# Patient Record
Sex: MALE | Race: WHITE | NOT HISPANIC OR LATINO | URBAN - METROPOLITAN AREA
[De-identification: names, ages, dates, MRNs, and addresses within clinical notes are randomized per-mention and may not be internally consistent; named-entity substitution may affect disease eponyms.]

---

## 2017-11-26 ENCOUNTER — INPATIENT (INPATIENT)
Facility: HOSPITAL | Age: 42
LOS: 1 days | Discharge: HOME | DRG: 389 | End: 2017-11-28
Attending: SURGERY | Admitting: SURGERY
Payer: COMMERCIAL

## 2017-11-26 ENCOUNTER — EMERGENCY (EMERGENCY)
Facility: HOSPITAL | Age: 42
LOS: 0 days | Discharge: ANOTHER HOSPITAL | End: 2017-11-26
Attending: EMERGENCY MEDICINE | Admitting: EMERGENCY MEDICINE
Payer: COMMERCIAL

## 2017-11-26 VITALS
RESPIRATION RATE: 18 BRPM | TEMPERATURE: 99 F | OXYGEN SATURATION: 99 % | DIASTOLIC BLOOD PRESSURE: 72 MMHG | SYSTOLIC BLOOD PRESSURE: 106 MMHG | HEART RATE: 77 BPM

## 2017-11-26 VITALS
SYSTOLIC BLOOD PRESSURE: 112 MMHG | HEART RATE: 76 BPM | DIASTOLIC BLOOD PRESSURE: 64 MMHG | OXYGEN SATURATION: 98 % | RESPIRATION RATE: 18 BRPM

## 2017-11-26 VITALS
WEIGHT: 149.91 LBS | HEIGHT: 69 IN | SYSTOLIC BLOOD PRESSURE: 112 MMHG | HEART RATE: 82 BPM | RESPIRATION RATE: 16 BRPM | DIASTOLIC BLOOD PRESSURE: 71 MMHG | OXYGEN SATURATION: 100 % | TEMPERATURE: 99 F

## 2017-11-26 DIAGNOSIS — Z93.3 COLOSTOMY STATUS: ICD-10-CM

## 2017-11-26 DIAGNOSIS — K56.600 PARTIAL INTESTINAL OBSTRUCTION, UNSPECIFIED AS TO CAUSE: ICD-10-CM

## 2017-11-26 DIAGNOSIS — R10.31 RIGHT LOWER QUADRANT PAIN: ICD-10-CM

## 2017-11-26 DIAGNOSIS — K50.90 CROHN'S DISEASE, UNSPECIFIED, WITHOUT COMPLICATIONS: ICD-10-CM

## 2017-11-26 DIAGNOSIS — Z92.89 PERSONAL HISTORY OF OTHER MEDICAL TREATMENT: Chronic | ICD-10-CM

## 2017-11-26 LAB
ALBUMIN SERPL ELPH-MCNC: 3.8 G/DL — SIGNIFICANT CHANGE UP (ref 3.3–5)
ALP SERPL-CCNC: 54 U/L — SIGNIFICANT CHANGE UP (ref 40–120)
ALT FLD-CCNC: 91 U/L — HIGH (ref 12–78)
AMPHET UR-MCNC: NEGATIVE — SIGNIFICANT CHANGE UP
ANION GAP SERPL CALC-SCNC: 8 MMOL/L — SIGNIFICANT CHANGE UP (ref 5–17)
APPEARANCE UR: CLEAR — SIGNIFICANT CHANGE UP
AST SERPL-CCNC: 65 U/L — HIGH (ref 15–37)
BACTERIA # UR AUTO: (no result)
BARBITURATES UR SCN-MCNC: NEGATIVE — SIGNIFICANT CHANGE UP
BASOPHILS # BLD AUTO: 0.1 K/UL — SIGNIFICANT CHANGE UP (ref 0–0.2)
BASOPHILS NFR BLD AUTO: 0.6 % — SIGNIFICANT CHANGE UP (ref 0–2)
BENZODIAZ UR-MCNC: NEGATIVE — SIGNIFICANT CHANGE UP
BILIRUB SERPL-MCNC: 1.4 MG/DL — HIGH (ref 0.2–1.2)
BILIRUB UR-MCNC: NEGATIVE — SIGNIFICANT CHANGE UP
BUN SERPL-MCNC: 9 MG/DL — SIGNIFICANT CHANGE UP (ref 7–23)
CALCIUM SERPL-MCNC: 9.3 MG/DL — SIGNIFICANT CHANGE UP (ref 8.5–10.1)
CHLORIDE SERPL-SCNC: 105 MMOL/L — SIGNIFICANT CHANGE UP (ref 96–108)
CO2 SERPL-SCNC: 26 MMOL/L — SIGNIFICANT CHANGE UP (ref 22–31)
COCAINE METAB.OTHER UR-MCNC: NEGATIVE — SIGNIFICANT CHANGE UP
COLOR SPEC: YELLOW — SIGNIFICANT CHANGE UP
CREAT SERPL-MCNC: 1.18 MG/DL — SIGNIFICANT CHANGE UP (ref 0.5–1.3)
DIFF PNL FLD: (no result)
EOSINOPHIL # BLD AUTO: 0.1 K/UL — SIGNIFICANT CHANGE UP (ref 0–0.5)
EOSINOPHIL NFR BLD AUTO: 0.6 % — SIGNIFICANT CHANGE UP (ref 0–6)
EPI CELLS # UR: NEGATIVE — SIGNIFICANT CHANGE UP
GLUCOSE SERPL-MCNC: 84 MG/DL — SIGNIFICANT CHANGE UP (ref 70–99)
GLUCOSE UR QL: NEGATIVE MG/DL — SIGNIFICANT CHANGE UP
HCT VFR BLD CALC: 44.3 % — SIGNIFICANT CHANGE UP (ref 39–50)
HGB BLD-MCNC: 15.6 G/DL — SIGNIFICANT CHANGE UP (ref 13–17)
KETONES UR-MCNC: (no result)
LEUKOCYTE ESTERASE UR-ACNC: NEGATIVE — SIGNIFICANT CHANGE UP
LIDOCAIN IGE QN: 172 U/L — SIGNIFICANT CHANGE UP (ref 73–393)
LYMPHOCYTES # BLD AUTO: 1.5 K/UL — SIGNIFICANT CHANGE UP (ref 1–3.3)
LYMPHOCYTES # BLD AUTO: 14.9 % — SIGNIFICANT CHANGE UP (ref 13–44)
MCHC RBC-ENTMCNC: 30.5 PG — SIGNIFICANT CHANGE UP (ref 27–34)
MCHC RBC-ENTMCNC: 35.1 GM/DL — SIGNIFICANT CHANGE UP (ref 32–36)
MCV RBC AUTO: 86.7 FL — SIGNIFICANT CHANGE UP (ref 80–100)
METHADONE UR-MCNC: NEGATIVE — SIGNIFICANT CHANGE UP
MONOCYTES # BLD AUTO: 0.6 K/UL — SIGNIFICANT CHANGE UP (ref 0–0.9)
MONOCYTES NFR BLD AUTO: 5.4 % — SIGNIFICANT CHANGE UP (ref 2–14)
NEUTROPHILS # BLD AUTO: 8.2 K/UL — HIGH (ref 1.8–7.4)
NEUTROPHILS NFR BLD AUTO: 78.5 % — HIGH (ref 43–77)
NITRITE UR-MCNC: NEGATIVE — SIGNIFICANT CHANGE UP
OPIATES UR-MCNC: POSITIVE — SIGNIFICANT CHANGE UP
PCP SPEC-MCNC: SIGNIFICANT CHANGE UP
PCP UR-MCNC: NEGATIVE — SIGNIFICANT CHANGE UP
PH UR: 7 — SIGNIFICANT CHANGE UP (ref 5–8)
PLATELET # BLD AUTO: 260 K/UL — SIGNIFICANT CHANGE UP (ref 150–400)
POTASSIUM SERPL-MCNC: 4.6 MMOL/L — SIGNIFICANT CHANGE UP (ref 3.5–5.3)
POTASSIUM SERPL-SCNC: 4.6 MMOL/L — SIGNIFICANT CHANGE UP (ref 3.5–5.3)
PROT SERPL-MCNC: 7.7 GM/DL — SIGNIFICANT CHANGE UP (ref 6–8.3)
PROT UR-MCNC: NEGATIVE MG/DL — SIGNIFICANT CHANGE UP
RBC # BLD: 5.11 M/UL — SIGNIFICANT CHANGE UP (ref 4.2–5.8)
RBC # FLD: 11.2 % — SIGNIFICANT CHANGE UP (ref 10.3–14.5)
RBC CASTS # UR COMP ASSIST: SIGNIFICANT CHANGE UP /HPF (ref 0–4)
SODIUM SERPL-SCNC: 139 MMOL/L — SIGNIFICANT CHANGE UP (ref 135–145)
SP GR SPEC: 1.01 — SIGNIFICANT CHANGE UP (ref 1.01–1.02)
THC UR QL: NEGATIVE — SIGNIFICANT CHANGE UP
UROBILINOGEN FLD QL: NEGATIVE MG/DL — SIGNIFICANT CHANGE UP
WBC # BLD: 10.4 K/UL — SIGNIFICANT CHANGE UP (ref 3.8–10.5)
WBC # FLD AUTO: 10.4 K/UL — SIGNIFICANT CHANGE UP (ref 3.8–10.5)
WBC UR QL: NEGATIVE — SIGNIFICANT CHANGE UP

## 2017-11-26 PROCEDURE — 99285 EMERGENCY DEPT VISIT HI MDM: CPT | Mod: 25

## 2017-11-26 PROCEDURE — 93010 ELECTROCARDIOGRAM REPORT: CPT

## 2017-11-26 PROCEDURE — 74177 CT ABD & PELVIS W/CONTRAST: CPT | Mod: 26

## 2017-11-26 PROCEDURE — 99285 EMERGENCY DEPT VISIT HI MDM: CPT

## 2017-11-26 RX ORDER — SODIUM CHLORIDE 9 MG/ML
3 INJECTION INTRAMUSCULAR; INTRAVENOUS; SUBCUTANEOUS ONCE
Qty: 0 | Refills: 0 | Status: COMPLETED | OUTPATIENT
Start: 2017-11-26 | End: 2017-11-26

## 2017-11-26 RX ORDER — HYDROMORPHONE HYDROCHLORIDE 2 MG/ML
1 INJECTION INTRAMUSCULAR; INTRAVENOUS; SUBCUTANEOUS ONCE
Qty: 0 | Refills: 0 | Status: DISCONTINUED | OUTPATIENT
Start: 2017-11-26 | End: 2017-11-26

## 2017-11-26 RX ORDER — CIPROFLOXACIN LACTATE 400MG/40ML
VIAL (ML) INTRAVENOUS
Qty: 0 | Refills: 0 | Status: DISCONTINUED | OUTPATIENT
Start: 2017-11-27 | End: 2017-11-28

## 2017-11-26 RX ORDER — METRONIDAZOLE 500 MG
TABLET ORAL
Qty: 0 | Refills: 0 | Status: DISCONTINUED | OUTPATIENT
Start: 2017-11-27 | End: 2017-11-28

## 2017-11-26 RX ORDER — ONDANSETRON 8 MG/1
4 TABLET, FILM COATED ORAL ONCE
Qty: 0 | Refills: 0 | Status: COMPLETED | OUTPATIENT
Start: 2017-11-26 | End: 2017-11-26

## 2017-11-26 RX ORDER — METRONIDAZOLE 500 MG
500 TABLET ORAL ONCE
Qty: 0 | Refills: 0 | Status: COMPLETED | OUTPATIENT
Start: 2017-11-26 | End: 2017-11-27

## 2017-11-26 RX ORDER — SODIUM CHLORIDE 9 MG/ML
1000 INJECTION INTRAMUSCULAR; INTRAVENOUS; SUBCUTANEOUS ONCE
Qty: 0 | Refills: 0 | Status: COMPLETED | OUTPATIENT
Start: 2017-11-26 | End: 2017-11-26

## 2017-11-26 RX ORDER — CIPROFLOXACIN LACTATE 400MG/40ML
400 VIAL (ML) INTRAVENOUS ONCE
Qty: 0 | Refills: 0 | Status: COMPLETED | OUTPATIENT
Start: 2017-11-26 | End: 2017-11-27

## 2017-11-26 RX ORDER — SODIUM CHLORIDE 9 MG/ML
1000 INJECTION INTRAMUSCULAR; INTRAVENOUS; SUBCUTANEOUS
Qty: 0 | Refills: 0 | Status: DISCONTINUED | OUTPATIENT
Start: 2017-11-26 | End: 2017-11-26

## 2017-11-26 RX ORDER — METRONIDAZOLE 500 MG
500 TABLET ORAL EVERY 8 HOURS
Qty: 0 | Refills: 0 | Status: DISCONTINUED | OUTPATIENT
Start: 2017-11-27 | End: 2017-11-28

## 2017-11-26 RX ORDER — CIPROFLOXACIN LACTATE 400MG/40ML
400 VIAL (ML) INTRAVENOUS EVERY 12 HOURS
Qty: 0 | Refills: 0 | Status: DISCONTINUED | OUTPATIENT
Start: 2017-11-27 | End: 2017-11-28

## 2017-11-26 RX ORDER — SODIUM CHLORIDE 9 MG/ML
1000 INJECTION, SOLUTION INTRAVENOUS
Qty: 0 | Refills: 0 | Status: DISCONTINUED | OUTPATIENT
Start: 2017-11-26 | End: 2017-11-27

## 2017-11-26 RX ORDER — ENOXAPARIN SODIUM 100 MG/ML
40 INJECTION SUBCUTANEOUS DAILY
Qty: 0 | Refills: 0 | Status: DISCONTINUED | OUTPATIENT
Start: 2017-11-26 | End: 2017-11-28

## 2017-11-26 RX ORDER — SODIUM CHLORIDE 9 MG/ML
1000 INJECTION INTRAMUSCULAR; INTRAVENOUS; SUBCUTANEOUS
Qty: 0 | Refills: 0 | Status: COMPLETED | OUTPATIENT
Start: 2017-11-26 | End: 2017-11-26

## 2017-11-26 RX ORDER — ACETAMINOPHEN 500 MG
1000 TABLET ORAL ONCE
Qty: 0 | Refills: 0 | Status: DISCONTINUED | OUTPATIENT
Start: 2017-11-26 | End: 2017-11-26

## 2017-11-26 RX ORDER — LIDOCAINE 4 G/100G
10 CREAM TOPICAL ONCE
Qty: 0 | Refills: 0 | Status: DISCONTINUED | OUTPATIENT
Start: 2017-11-26 | End: 2017-11-26

## 2017-11-26 RX ADMIN — ONDANSETRON 4 MILLIGRAM(S): 8 TABLET, FILM COATED ORAL at 14:03

## 2017-11-26 RX ADMIN — HYDROMORPHONE HYDROCHLORIDE 1 MILLIGRAM(S): 2 INJECTION INTRAMUSCULAR; INTRAVENOUS; SUBCUTANEOUS at 14:03

## 2017-11-26 RX ADMIN — SODIUM CHLORIDE 1000 MILLILITER(S): 9 INJECTION INTRAMUSCULAR; INTRAVENOUS; SUBCUTANEOUS at 14:04

## 2017-11-26 RX ADMIN — HYDROMORPHONE HYDROCHLORIDE 1 MILLIGRAM(S): 2 INJECTION INTRAMUSCULAR; INTRAVENOUS; SUBCUTANEOUS at 14:18

## 2017-11-26 RX ADMIN — SODIUM CHLORIDE 200 MILLILITER(S): 9 INJECTION INTRAMUSCULAR; INTRAVENOUS; SUBCUTANEOUS at 22:52

## 2017-11-26 RX ADMIN — HYDROMORPHONE HYDROCHLORIDE 1 MILLIGRAM(S): 2 INJECTION INTRAMUSCULAR; INTRAVENOUS; SUBCUTANEOUS at 21:28

## 2017-11-26 RX ADMIN — HYDROMORPHONE HYDROCHLORIDE 1 MILLIGRAM(S): 2 INJECTION INTRAMUSCULAR; INTRAVENOUS; SUBCUTANEOUS at 21:36

## 2017-11-26 RX ADMIN — SODIUM CHLORIDE 3 MILLILITER(S): 9 INJECTION INTRAMUSCULAR; INTRAVENOUS; SUBCUTANEOUS at 14:15

## 2017-11-26 RX ADMIN — SODIUM CHLORIDE 200 MILLILITER(S): 9 INJECTION INTRAMUSCULAR; INTRAVENOUS; SUBCUTANEOUS at 16:44

## 2017-11-26 RX ADMIN — HYDROMORPHONE HYDROCHLORIDE 1 MILLIGRAM(S): 2 INJECTION INTRAMUSCULAR; INTRAVENOUS; SUBCUTANEOUS at 16:43

## 2017-11-26 RX ADMIN — SODIUM CHLORIDE 1000 MILLILITER(S): 9 INJECTION INTRAMUSCULAR; INTRAVENOUS; SUBCUTANEOUS at 15:34

## 2017-11-26 RX ADMIN — HYDROMORPHONE HYDROCHLORIDE 1 MILLIGRAM(S): 2 INJECTION INTRAMUSCULAR; INTRAVENOUS; SUBCUTANEOUS at 18:45

## 2017-11-26 RX ADMIN — HYDROMORPHONE HYDROCHLORIDE 1 MILLIGRAM(S): 2 INJECTION INTRAMUSCULAR; INTRAVENOUS; SUBCUTANEOUS at 15:48

## 2017-11-26 RX ADMIN — HYDROMORPHONE HYDROCHLORIDE 1 MILLIGRAM(S): 2 INJECTION INTRAMUSCULAR; INTRAVENOUS; SUBCUTANEOUS at 14:55

## 2017-11-26 RX ADMIN — HYDROMORPHONE HYDROCHLORIDE 1 MILLIGRAM(S): 2 INJECTION INTRAMUSCULAR; INTRAVENOUS; SUBCUTANEOUS at 22:53

## 2017-11-26 RX ADMIN — HYDROMORPHONE HYDROCHLORIDE 1 MILLIGRAM(S): 2 INJECTION INTRAMUSCULAR; INTRAVENOUS; SUBCUTANEOUS at 14:40

## 2017-11-26 RX ADMIN — HYDROMORPHONE HYDROCHLORIDE 1 MILLIGRAM(S): 2 INJECTION INTRAMUSCULAR; INTRAVENOUS; SUBCUTANEOUS at 15:33

## 2017-11-26 RX ADMIN — ONDANSETRON 4 MILLIGRAM(S): 8 TABLET, FILM COATED ORAL at 22:52

## 2017-11-26 NOTE — ED ADULT NURSE NOTE - OBJECTIVE STATEMENT
Pt with hx Crohns with  2 resections c/o abd pain and vomiting.  Pt is in pain distress, tearful.  Pt reports episode onset last night after midnight.

## 2017-11-26 NOTE — H&P ADULT - NSHPLABSRESULTS_GEN_ALL_CORE
15.6   10.4  )-----------( 260      ( 2017 13:46 )             44.3         139  |  105  |  9   ----------------------------<  84  4.6   |  26  |  1.18    Ca    9.3      2017 13:46    TPro  7.7  /  Alb  3.8  /  TBili  1.4<H>  /  DBili  x   /  AST  65<H>  /  ALT  91<H>  /  AlkPhos  54        Urinalysis Basic - ( 2017 14:55 )    Color: Yellow / Appearance: Clear / S.010 / pH: x  Gluc: x / Ketone: Moderate  / Bili: Negative / Urobili: Negative mg/dL   Blood: x / Protein: Negative mg/dL / Nitrite: Negative   Leuk Esterase: Negative / RBC: 0-2 /HPF / WBC Negative   Sq Epi: x / Non Sq Epi: Negative / Bacteria: Occasional        IMAGING STUDIES:  RIGHT hemicolectomy with anastomosis in the RIGHT upper   quadrant. There is distention of ileum with fecalization proximal to the   anastomosis suggesting obstruction at this level. Haziness about the rectum   may reflect inflammation.

## 2017-11-26 NOTE — ED PROVIDER NOTE - CONSTITUTIONAL, MLM
normal... Thin white male, awake, alert, oriented to person, place, time/situation and in acute distress due to abd pain. +moaning.

## 2017-11-26 NOTE — ED PROVIDER NOTE - DIAGNOSIS COUNSELING, MDM
I had a detailed discussion with the patient and/or guardian regarding the historical points, exam findings, and any diagnostic results supporting the discharge/admit diagnosis. I reviewed all lab and imaging results from this ED visit, and discussed ALL results with the patient, including all abnormal results and incidental findings. I recommened appropriate follow up for all incidental findings. The patient expressed understanding of all results discussed and follow up instructions given. conducted a detailed discussion...

## 2017-11-26 NOTE — H&P ADULT - HISTORY OF PRESENT ILLNESS
This is a 42 year old male with medical history of Crohn's disease s/p multiple bowel resections on Remicade presenting as transfer from [facility] with abdominal pain in the RLQ, the pain is constant, sharp, associated with multiple episodes of vomiting of clear fluid, He had a normal bowel movement a day ago and passed flatus around the same time. He denies fevers or chills. The pain is similar to prior episodes of bowel obstruction. Of note, the patient was admitted multiple times with bowel obstruction.   ROS: Denies fever, palpitations, chills, recent sickness, HA, vision changes, cough, SOB, chest pain, dysuria, hematuria, rash, new joint aches, sick contacts, and recent travel.

## 2017-11-26 NOTE — ED ADULT NURSE REASSESSMENT NOTE - NS ED NURSE REASSESS COMMENT FT1
Pt's mother yelling and crying at desk, demanding Dilaudid for her son, requesting Dr. Arita from [facility].
Pt is refusing oral contrast.
patient resting in bed. alert and oriented x4, respirations even and unlabored, no nausea or vomiting. states pain is tolerable at this time after receiving last dose of pain medication, informed patient I would reassess frequently and to let me know if pain worsens. iv fluids infusing. ct scan performed and resulted, results explained to patient by MD. plan to transfer patient to [place] to be seen by his surgeon dr. kiser, patient aware of plan. awaiting call from transfer center to give report. will continue to monitor.
patient transferred to [place]. transfer center contacted, spoke to luis at transfer center to confirm report given, states report received by receiving facility. patient medicated for pain prior to transfer. report given to paramedic at bedside. patient's mother to follow patient to [place]. patient alert and oriented x4, respirations even and unlabored, states pain alleviated post medication administration prior to leaving emergency department. transferred from ED at 2140.

## 2017-11-26 NOTE — ED PROVIDER NOTE - MEDICAL DECISION MAKING DETAILS
Tx from OSH, crohns flare with bowel obstruction. Plan: labs, consult surgery, NG tube, pain control, ivf

## 2017-11-26 NOTE — ED PROVIDER NOTE - OBJECTIVE STATEMENT
43 y/o white M with PMHx of Crohn's, multiple abd surgeries (pt reports no APPY to date) BIBA from home presents to the ED c/o acute, sharp, severe RLQ abd pain. Pain started at midnight last night and does not radiate. Pt likens pain to prior Crohn's exacerbations. Pt reports N/V, poor flatus. Pt denies Fever, chills, worsened SOB. Pt wears a colostomy bag (9 months). Pt has had prior episodes of SBO. Allergic to Penicillin and Ciklor. Surgeon: Dr. Javed at [place]. PMD: Dr. Lefty Bañuelos.

## 2017-11-26 NOTE — H&P ADULT - NSHPPHYSICALEXAM_GEN_ALL_CORE
General: NAD  Neurology: A&Ox3  ENT:  Mucosa dry   Lymphatic:  No palpable cervical  Respiratory: CTA B/L  CV: RRR, S1S2, no murmur  Abdominal: Soft, Not distended, mildly tender in RLQ, no guarding, no palpable masses or hernias.   MSK: No edema

## 2017-11-26 NOTE — ED PROVIDER NOTE - GASTROINTESTINAL, MLM
Abdomen: surgical scars well healed, minimal BS, not high pitched. Diffuse abd tenderness, greatest in RLQ.

## 2017-11-26 NOTE — H&P ADULT - ASSESSMENT
A/P: This is a 42 year old male with a medical history of Crohn's disease on Remicade, multiple bowel resection in the past (s/p right hemicolectomy with ileocolic anastomosis) presents with SBO at the anastomosis site.      - Admit to colorectal surgery (Red Surgery) under the care of Dr. Papi Javed   - Make him Nil Per Os  - No need for NGT  - Serial abdominal exams, no PRN pain medication   - IV hydration.   - Follow GI function, would treat this episode of SBO conservatively with bowel rest.   - Discussed with Dr. Papi Sierra (colorectal surgery)

## 2017-11-26 NOTE — ED PROVIDER NOTE - MEDICAL DECISION MAKING DETAILS
41 y/o white M h/o Crohn's, multiple abd surgeries (pt reports no APPY to date) BIBA from home with acute, severe RLQ abd pain. Pt likens pain to prior Crohn's exacerbations. Poor flatus, + N/V. No Fever, chills, SOB.  Diffuse abd tenderness especially RLQ, + voluntary guarding.  Plan for labs, IV fluids, IV pain meds, Zofran, urine, CT abd/pelvis.

## 2017-11-26 NOTE — ED PROVIDER NOTE - PHYSICAL EXAMINATION
Gen: NAD, AOx3  Head: NCAT  HEENT: PERRL, oral mucosa moist, normal conjunctiva  Lung: CTAB, no respiratory distress  CV: rrr, no murmurs, Normal perfusion  Abd: soft, tender RLQ, no CVA tenderness  MSK: No edema, no visible deformities  Neuro: No focal neurologic deficits  Skin: No rash   Psych: normal affect
Statement Selected

## 2017-11-26 NOTE — ED PROVIDER NOTE - PROGRESS NOTE DETAILS
Dr. Mercado:  Pt has required  IV dilaudid ~ q 1 hr. for his abd. pain.  CT A/P suggestive of PSBO.  Mother requesting pt's surgeon Dr. Javed ([facility]), to be called.  Call put out. Attending Gavin ROJO:  Sign out from Dr. Mercado.  Surgeon at [place] paged for SBO. Dr. Mercado:  Signout to Dr. Alonso:  [] d/w Dr. Javed or covering surgeon for possible tx.  If tx declined, pt TBA surgery svce at . Attending Dr. Alonso:  Spoke to Dr. Sierra, Surgery at [place] who agreed for transfer to the ED.

## 2017-11-26 NOTE — ED PROVIDER NOTE - OBJECTIVE STATEMENT
Patient is 42 y M with PMH crohns s/p multiple bowel resections on remicade presenting as tx from [facility] with abdominal pain RLQ pain, constant, sharp, a/w multiple episodes of vomiting clear fluid, had diarrhea (normal for patient) 9PM 11/25. Sx consistent with usual flares.     PMD: Lefty Bañuelos  GI: in [place]  Surg: J Procaccino/Ericka  ROS: Denies fever, palpitations, chills, recent sickness, HA, vision changes, cough, SOB, chest pain, dysuria, hematuria, rash, new joint aches, sick contacts, and recent travel.

## 2017-11-26 NOTE — ED PROVIDER NOTE - MUSCULOSKELETAL, MLM
Spine appears normal, range of motion is not limited, no muscle or joint tenderness. MAEx4. No leg pain.

## 2017-11-27 DIAGNOSIS — K56.609 UNSPECIFIED INTESTINAL OBSTRUCTION, UNSPECIFIED AS TO PARTIAL VERSUS COMPLETE OBSTRUCTION: ICD-10-CM

## 2017-11-27 LAB
ANION GAP SERPL CALC-SCNC: 12 MMOL/L — SIGNIFICANT CHANGE UP (ref 5–17)
BUN SERPL-MCNC: 9 MG/DL — SIGNIFICANT CHANGE UP (ref 7–23)
CALCIUM SERPL-MCNC: 8.7 MG/DL — SIGNIFICANT CHANGE UP (ref 8.4–10.5)
CHLORIDE SERPL-SCNC: 101 MMOL/L — SIGNIFICANT CHANGE UP (ref 96–108)
CO2 SERPL-SCNC: 25 MMOL/L — SIGNIFICANT CHANGE UP (ref 22–31)
CREAT SERPL-MCNC: 1.02 MG/DL — SIGNIFICANT CHANGE UP (ref 0.5–1.3)
GLUCOSE SERPL-MCNC: 91 MG/DL — SIGNIFICANT CHANGE UP (ref 70–99)
HCT VFR BLD CALC: 41.7 % — SIGNIFICANT CHANGE UP (ref 39–50)
HGB BLD-MCNC: 14.6 G/DL — SIGNIFICANT CHANGE UP (ref 13–17)
MAGNESIUM SERPL-MCNC: 1.7 MG/DL — SIGNIFICANT CHANGE UP (ref 1.6–2.6)
MCHC RBC-ENTMCNC: 31.4 PG — SIGNIFICANT CHANGE UP (ref 27–34)
MCHC RBC-ENTMCNC: 35.1 GM/DL — SIGNIFICANT CHANGE UP (ref 32–36)
MCV RBC AUTO: 89.6 FL — SIGNIFICANT CHANGE UP (ref 80–100)
PHOSPHATE SERPL-MCNC: 2.9 MG/DL — SIGNIFICANT CHANGE UP (ref 2.5–4.5)
PLATELET # BLD AUTO: 191 K/UL — SIGNIFICANT CHANGE UP (ref 150–400)
POTASSIUM SERPL-MCNC: 3.8 MMOL/L — SIGNIFICANT CHANGE UP (ref 3.5–5.3)
POTASSIUM SERPL-SCNC: 3.8 MMOL/L — SIGNIFICANT CHANGE UP (ref 3.5–5.3)
RBC # BLD: 4.66 M/UL — SIGNIFICANT CHANGE UP (ref 4.2–5.8)
RBC # FLD: 11.3 % — SIGNIFICANT CHANGE UP (ref 10.3–14.5)
SODIUM SERPL-SCNC: 138 MMOL/L — SIGNIFICANT CHANGE UP (ref 135–145)
WBC # BLD: 6.1 K/UL — SIGNIFICANT CHANGE UP (ref 3.8–10.5)
WBC # FLD AUTO: 6.1 K/UL — SIGNIFICANT CHANGE UP (ref 3.8–10.5)

## 2017-11-27 PROCEDURE — 74000: CPT | Mod: 26

## 2017-11-27 PROCEDURE — 99221 1ST HOSP IP/OBS SF/LOW 40: CPT

## 2017-11-27 RX ORDER — DEXTROSE MONOHYDRATE, SODIUM CHLORIDE, AND POTASSIUM CHLORIDE 50; .745; 4.5 G/1000ML; G/1000ML; G/1000ML
1000 INJECTION, SOLUTION INTRAVENOUS
Qty: 0 | Refills: 0 | Status: DISCONTINUED | OUTPATIENT
Start: 2017-11-27 | End: 2017-11-28

## 2017-11-27 RX ORDER — HYDROMORPHONE HYDROCHLORIDE 2 MG/ML
1 INJECTION INTRAMUSCULAR; INTRAVENOUS; SUBCUTANEOUS ONCE
Qty: 0 | Refills: 0 | Status: DISCONTINUED | OUTPATIENT
Start: 2017-11-27 | End: 2017-11-27

## 2017-11-27 RX ORDER — HYDROMORPHONE HYDROCHLORIDE 2 MG/ML
0.5 INJECTION INTRAMUSCULAR; INTRAVENOUS; SUBCUTANEOUS ONCE
Qty: 0 | Refills: 0 | Status: DISCONTINUED | OUTPATIENT
Start: 2017-11-27 | End: 2017-11-27

## 2017-11-27 RX ORDER — ACETAMINOPHEN 500 MG
650 TABLET ORAL EVERY 6 HOURS
Qty: 0 | Refills: 0 | Status: DISCONTINUED | OUTPATIENT
Start: 2017-11-27 | End: 2017-11-28

## 2017-11-27 RX ORDER — INFLUENZA VIRUS VACCINE 15; 15; 15; 15 UG/.5ML; UG/.5ML; UG/.5ML; UG/.5ML
0.5 SUSPENSION INTRAMUSCULAR ONCE
Qty: 0 | Refills: 0 | Status: DISCONTINUED | OUTPATIENT
Start: 2017-11-27 | End: 2017-11-28

## 2017-11-27 RX ORDER — ONDANSETRON 8 MG/1
4 TABLET, FILM COATED ORAL EVERY 6 HOURS
Qty: 0 | Refills: 0 | Status: DISCONTINUED | OUTPATIENT
Start: 2017-11-27 | End: 2017-11-28

## 2017-11-27 RX ORDER — ACETAMINOPHEN 500 MG
1000 TABLET ORAL ONCE
Qty: 0 | Refills: 0 | Status: COMPLETED | OUTPATIENT
Start: 2017-11-27 | End: 2017-11-27

## 2017-11-27 RX ADMIN — HYDROMORPHONE HYDROCHLORIDE 1 MILLIGRAM(S): 2 INJECTION INTRAMUSCULAR; INTRAVENOUS; SUBCUTANEOUS at 00:00

## 2017-11-27 RX ADMIN — HYDROMORPHONE HYDROCHLORIDE 1 MILLIGRAM(S): 2 INJECTION INTRAMUSCULAR; INTRAVENOUS; SUBCUTANEOUS at 01:24

## 2017-11-27 RX ADMIN — Medication 650 MILLIGRAM(S): at 18:20

## 2017-11-27 RX ADMIN — Medication 100 MILLIGRAM(S): at 21:23

## 2017-11-27 RX ADMIN — ONDANSETRON 4 MILLIGRAM(S): 8 TABLET, FILM COATED ORAL at 05:28

## 2017-11-27 RX ADMIN — HYDROMORPHONE HYDROCHLORIDE 1 MILLIGRAM(S): 2 INJECTION INTRAMUSCULAR; INTRAVENOUS; SUBCUTANEOUS at 05:01

## 2017-11-27 RX ADMIN — Medication 1000 MILLIGRAM(S): at 09:02

## 2017-11-27 RX ADMIN — ENOXAPARIN SODIUM 40 MILLIGRAM(S): 100 INJECTION SUBCUTANEOUS at 12:34

## 2017-11-27 RX ADMIN — Medication 650 MILLIGRAM(S): at 23:51

## 2017-11-27 RX ADMIN — Medication 200 MILLIGRAM(S): at 17:15

## 2017-11-27 RX ADMIN — ENOXAPARIN SODIUM 40 MILLIGRAM(S): 100 INJECTION SUBCUTANEOUS at 00:05

## 2017-11-27 RX ADMIN — Medication 100 MILLIGRAM(S): at 05:00

## 2017-11-27 RX ADMIN — Medication 100 MILLIGRAM(S): at 15:08

## 2017-11-27 RX ADMIN — Medication 200 MILLIGRAM(S): at 05:28

## 2017-11-27 RX ADMIN — HYDROMORPHONE HYDROCHLORIDE 0.5 MILLIGRAM(S): 2 INJECTION INTRAMUSCULAR; INTRAVENOUS; SUBCUTANEOUS at 12:34

## 2017-11-27 RX ADMIN — Medication 100 MILLIGRAM(S): at 01:24

## 2017-11-27 RX ADMIN — HYDROMORPHONE HYDROCHLORIDE 0.5 MILLIGRAM(S): 2 INJECTION INTRAMUSCULAR; INTRAVENOUS; SUBCUTANEOUS at 08:32

## 2017-11-27 RX ADMIN — HYDROMORPHONE HYDROCHLORIDE 1 MILLIGRAM(S): 2 INJECTION INTRAMUSCULAR; INTRAVENOUS; SUBCUTANEOUS at 05:31

## 2017-11-27 RX ADMIN — Medication 200 MILLIGRAM(S): at 00:05

## 2017-11-27 RX ADMIN — Medication 400 MILLIGRAM(S): at 08:32

## 2017-11-27 RX ADMIN — SODIUM CHLORIDE 125 MILLILITER(S): 9 INJECTION, SOLUTION INTRAVENOUS at 01:23

## 2017-11-27 RX ADMIN — Medication 650 MILLIGRAM(S): at 17:50

## 2017-11-27 RX ADMIN — HYDROMORPHONE HYDROCHLORIDE 0.5 MILLIGRAM(S): 2 INJECTION INTRAMUSCULAR; INTRAVENOUS; SUBCUTANEOUS at 09:02

## 2017-11-27 RX ADMIN — HYDROMORPHONE HYDROCHLORIDE 0.5 MILLIGRAM(S): 2 INJECTION INTRAMUSCULAR; INTRAVENOUS; SUBCUTANEOUS at 13:04

## 2017-11-27 NOTE — CHART NOTE - NSCHARTNOTEFT_GEN_A_CORE
-Paged by RN due to patient reporting abdominal pain. Patient seen and examined. Patient stable. Denies worsening pain, reports the pain is bad but not as bad as when he came in. Patient denies N/V. Reports passing flatus and BM.     ICU Vital Signs Last 24 Hrs  T(C): 36.7 (27 Nov 2017 06:23), Max: 37.3 (26 Nov 2017 13:34)  T(F): 98.1 (27 Nov 2017 06:23), Max: 99.1 (26 Nov 2017 13:34)  HR: 84 (27 Nov 2017 06:23) (60 - 84)  BP: 103/62 (27 Nov 2017 06:23) (103/62 - 131/72)  RR: 18 (27 Nov 2017 06:23) (16 - 18)  SpO2: 96% (27 Nov 2017 06:23) (96% - 100%)      Physical Exam:   Gen: NAD, AAOx3  Abd: soft, Non-distended, mild tenderness in the RLQ (unchanged)      42 year old male with SBO   -Pain control   -Monitor GI function   -Await diet at tolerated once exam has improved  -Continue to monitor with serial abdominal exams     Bea Hargrove PA-C  #[id number]

## 2017-11-27 NOTE — CHART NOTE - NSCHARTNOTEFT_GEN_A_CORE
Paged by RN for abdominal pain. Patient seen and examined. Patient stable. Reports pain is improving but still there. Denies N/V. Reports passing flatus but not BM. Reports the pain is not as bad as when he came in.       Vital Signs Last 24 Hrs  T(C): 36.9 (27 Nov 2017 10:36), Max: 37.3 (26 Nov 2017 13:34)  T(F): 98.4 (27 Nov 2017 10:36), Max: 99.1 (26 Nov 2017 13:34)  HR: 76 (27 Nov 2017 10:36) (60 - 84)  BP: 111/65 (27 Nov 2017 10:36) (103/62 - 131/72)  BP(mean): --  RR: 18 (27 Nov 2017 10:36) (16 - 18)  SpO2: 99% (27 Nov 2017 10:36) (96% - 100%)    Physical Exam:  Gen: NAD, AAOx3  Abd: soft, mild tenderness in the RLQ, non distended.  No rebound, no guarding, patient is not peritoneal.     Will discuss with attending physician or chief resident. Exam unchanged.     Bea Hargrove PA-C  #[id number]

## 2017-11-27 NOTE — PROGRESS NOTE ADULT - SUBJECTIVE AND OBJECTIVE BOX
Red Team Surgery Progress Note    SUBJECTIVE: Pt seen and examined at bedside. No overnight events. Pain is controlled with dilaudid 1mg IV - pt examined before medication and was not peritoneal. Patient has nausea and was given zofran.       Vital Signs Last 24 Hrs  T(C): 36.9 (2017 01:54), Max: 37.3 (2017 13:34)  T(F): 98.4 (2017 01:54), Max: 99.1 (2017 13:34)  HR: 61 (:54) (60 - 82)  BP: 103/63 (:54) (103/63 - 131/72)  BP(mean): --  RR: 16 (:54) (16 - 18)  SpO2: 96% (:54) (96% - 100%)    Physical Exam:  General Appearance: Appears well, NAD  Neck: Supple  Chest: Equal expansion bilaterally, equal breath sounds  CV: Pulse regular presently  Abdomen:   Extremities: Grossly symmetric    LABS:                        15.6   10.4  )-----------( 260      ( 2017 13:46 )             44.3         139  |  105  |  9   ----------------------------<  84  4.6   |  26  |  1.18    Ca    9.3      2017 13:46    TPro  7.7  /  Alb  3.8  /  TBili  1.4<H>  /  DBili  x   /  AST  65<H>  /  ALT  91<H>  /  AlkPhos  54        Urinalysis Basic - ( 2017 14:55 )    Color: Yellow / Appearance: Clear / S.010 / pH: x  Gluc: x / Ketone: Moderate  / Bili: Negative / Urobili: Negative mg/dL   Blood: x / Protein: Negative mg/dL / Nitrite: Negative   Leuk Esterase: Negative / RBC: 0-2 /HPF / WBC Negative   Sq Epi: x / Non Sq Epi: Negative / Bacteria: Occasional        INs and OUTs:    17 @ 07:01  -  17 @ 05:20  --------------------------------------------------------  IN: 700 mL / OUT: 400 mL / NET: 300 mL Red Team Surgery Progress Note    SUBJECTIVE: Pt seen and examined at bedside. No overnight events. Pain is controlled with dilaudid 1mg IV - pt examined before medication and was not peritoneal. Patient has nausea and was given zofran.       Vital Signs Last 24 Hrs  T(C): 36.9 (2017 01:54), Max: 37.3 (2017 13:34)  T(F): 98.4 (2017 01:54), Max: 99.1 (2017 13:34)  HR: 61 (:54) (60 - 82)  BP: 103/63 (:54) (103/63 - 131/72)  BP(mean): --  RR: 16 (:54) (16 - 18)  SpO2: 96% (:54) (96% - 100%)    Physical Exam:  General Appearance: Appears well, NAD  Neck: Supple  Chest: Equal expansion bilaterally, equal breath sounds  CV: Pulse regular presently  Abdomen: Nontense, non-peritoneal, mildly distended. Tympanitic, non-tender to palpation	  Extremities: Grossly symmetric    LABS:                        15.6   10.4  )-----------( 260      ( 2017 13:46 )             44.3         139  |  105  |  9   ----------------------------<  84  4.6   |  26  |  1.18    Ca    9.3      2017 13:46    TPro  7.7  /  Alb  3.8  /  TBili  1.4<H>  /  DBili  x   /  AST  65<H>  /  ALT  91<H>  /  AlkPhos  54        Urinalysis Basic - ( 2017 14:55 )    Color: Yellow / Appearance: Clear / S.010 / pH: x  Gluc: x / Ketone: Moderate  / Bili: Negative / Urobili: Negative mg/dL   Blood: x / Protein: Negative mg/dL / Nitrite: Negative   Leuk Esterase: Negative / RBC: 0-2 /HPF / WBC Negative   Sq Epi: x / Non Sq Epi: Negative / Bacteria: Occasional        INs and OUTs:    17 @ 07:01  -  17 @ 05:20  --------------------------------------------------------  IN: 700 mL / OUT: 400 mL / NET: 300 mL

## 2017-11-27 NOTE — CONSULT NOTE ADULT - SUBJECTIVE AND OBJECTIVE BOX
Mr. Batista is a 43 yo M w/ longstanding history of Crohn's Disease limited to the small bowel who is admitted w/ partial small bowel obstruction.  Mr. Batista has had five abdominal surgeries related to his CD. 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 He started Remicade at this time and has not had any other surgeries.  He has had multiple bowel obstructions which usually resolve with bowel rest / pain meds / IV Fluids. Patient was last seen in our office over ten years ago. He current sees a Gastroenterologist in [place] where he currently resides. He had a colonoscopy 3 years ago which reportedly did not show any active CD. Patient was visiting his parents for Thanksgiving and developed abdominal pain, multiple episodes of emesis and was admitted w/ bowel obstruction which he admits is like due to eating foods that are high fiber/unusual for him to normally consume. He improved clinically with bowel rest/ IVF/Pain control.  He currently feels well. ++ flatus but no BM yet. No nausea or vomiting. Abdominal cramps have resolved but he has residual "soreness" for which he needs IV pain meds.        PAST MEDICAL & SURGICAL HISTORY:  Crohns disease  History of bowel resection      MEDICATIONS  (STANDING):  ciprofloxacin   IVPB 400 milliGRAM(s) IV Intermittent every 12 hours  ciprofloxacin   IVPB      dextrose 5% + sodium chloride 0.45% with potassium chloride 20 mEq/L 1000 milliLiter(s) (75 mL/Hr) IV Continuous <Continuous>  enoxaparin Injectable 40 milliGRAM(s) SubCutaneous daily  influenza   Vaccine 0.5 milliLiter(s) IntraMuscular once  metroNIDAZOLE  IVPB 500 milliGRAM(s) IV Intermittent every 8 hours  metroNIDAZOLE  IVPB        MEDICATIONS  (PRN):  ondansetron Injectable 4 milliGRAM(s) IV Push every 6 hours PRN Nausea and/or Vomiting      Allergies    Ceclor (Hives)  penicillin (Hives)  penicillins (Unknown)    Intolerances        Review of Systems:    General:  No wt loss, fevers, chills, night sweats,fatigue,   CV:  No pain, palpitatioins, hypo/hypertension  Resp:  No dyspnea, cough, tachypnea, wheezing  GI:  see HPI  :  No pain, bleeding, incontinence, nocturia  Muscle:  No pain, weakness  Neuro:  No weakness, tingling, memory problems  Psych:  No fatigue, insomnia, mood problems, depression  Endocrine:  No polyuria, polydypsia, cold/heat intolerance  Heme:  No petechiae, ecchymosis, easy bruisability  Skin:  No rash, tattoos, scars, edema      Vital Signs Last 24 Hrs  T(C): 37 (2017 14:44), Max: 37.3 (2017 18:40)  T(F): 98.6 (2017 14:44), Max: 99.1 (2017 18:40)  HR: 62 (2017 14:44) (60 - 84)  BP: 113/66 (2017 14:44) (103/62 - 131/72)  BP(mean): --  RR: 18 (2017 14:44) (16 - 18)  SpO2: 99% (2017 14:44) (96% - 100%)    PHYSICAL EXAM:    Constitutional: NAD, well-developed  Neck: No LAD, supple  Respiratory: CTA and P  Cardiovascular: S1 and S2, RRR, no M  Gastrointestinal: BS+, prior surgical incisions well healed. mild abd distention but no pain or tenderness on exam  Extremities: No peripheral edema, neg clubing, cyanosis  Vascular: 2+ peripheral pulses  Neurological: A/O x 3, no focal deficits  Psychiatric: Normal mood, normal affect  Skin: No rashes      LABS:                        14.6   6.1   )-----------( 191      ( 2017 12:32 )             41.7                         15.6   10.4  )-----------( 260      ( 2017 13:46 )             44.3         138  |  101  |  9   ----------------------------<  91  3.8   |  25  |  1.02    Ca    8.7      2017 12:32  Phos  2.9       Mg     1.7         TPro  7.7  /  Alb  3.8  /  TBili  1.4<H>  /  DBili  x   /  AST  65<H>  /  ALT  91<H>  /  AlkPhos  54        Urinalysis Basic - ( 2017 14:55 )    Color: Yellow / Appearance: Clear / S.010 / pH: x  Gluc: x / Ketone: Moderate  / Bili: Negative / Urobili: Negative mg/dL   Blood: x / Protein: Negative mg/dL / Nitrite: Negative   Leuk Esterase: Negative / RBC: 0-2 /HPF / WBC Negative   Sq Epi: x / Non Sq Epi: Negative / Bacteria: Occasional      LIVER FUNCTIONS - ( 2017 13:46 )  Alb: 3.8 g/dL / Pro: 7.7 gm/dL / ALK PHOS: 54 U/L / ALT: 91 U/L / AST: 65 U/L / GGT: x           Lipase, Serum: 172 U/L (17 @ 13:46)        RADIOLOGY & ADDITIONAL TESTS:

## 2017-11-27 NOTE — CONSULT NOTE ADULT - ASSESSMENT
41 yo M w/ CD on Remicade admitted w/ bowel obstruction which has clinically much improved  Recommend AXR today  Trial of clear liquid diet  OOB / ambulate as tolerated   Advised patient to limit opioids if possible  Will follow

## 2017-11-28 VITALS
OXYGEN SATURATION: 98 % | SYSTOLIC BLOOD PRESSURE: 107 MMHG | HEART RATE: 70 BPM | DIASTOLIC BLOOD PRESSURE: 75 MMHG | TEMPERATURE: 98 F | RESPIRATION RATE: 18 BRPM

## 2017-11-28 LAB
ANION GAP SERPL CALC-SCNC: 12 MMOL/L — SIGNIFICANT CHANGE UP (ref 5–17)
BUN SERPL-MCNC: 7 MG/DL — SIGNIFICANT CHANGE UP (ref 7–23)
CALCIUM SERPL-MCNC: 8.7 MG/DL — SIGNIFICANT CHANGE UP (ref 8.4–10.5)
CHLORIDE SERPL-SCNC: 101 MMOL/L — SIGNIFICANT CHANGE UP (ref 96–108)
CO2 SERPL-SCNC: 26 MMOL/L — SIGNIFICANT CHANGE UP (ref 22–31)
CREAT SERPL-MCNC: 0.9 MG/DL — SIGNIFICANT CHANGE UP (ref 0.5–1.3)
GLUCOSE SERPL-MCNC: 95 MG/DL — SIGNIFICANT CHANGE UP (ref 70–99)
HCT VFR BLD CALC: 36.3 % — LOW (ref 39–50)
HGB BLD-MCNC: 13.4 G/DL — SIGNIFICANT CHANGE UP (ref 13–17)
MAGNESIUM SERPL-MCNC: 1.9 MG/DL — SIGNIFICANT CHANGE UP (ref 1.6–2.6)
MCHC RBC-ENTMCNC: 32.8 PG — SIGNIFICANT CHANGE UP (ref 27–34)
MCHC RBC-ENTMCNC: 36.9 GM/DL — HIGH (ref 32–36)
MCV RBC AUTO: 88.8 FL — SIGNIFICANT CHANGE UP (ref 80–100)
PHOSPHATE SERPL-MCNC: 3 MG/DL — SIGNIFICANT CHANGE UP (ref 2.5–4.5)
PLATELET # BLD AUTO: 190 K/UL — SIGNIFICANT CHANGE UP (ref 150–400)
POTASSIUM SERPL-MCNC: 4 MMOL/L — SIGNIFICANT CHANGE UP (ref 3.5–5.3)
POTASSIUM SERPL-SCNC: 4 MMOL/L — SIGNIFICANT CHANGE UP (ref 3.5–5.3)
RBC # BLD: 4.09 M/UL — LOW (ref 4.2–5.8)
RBC # FLD: 12.7 % — SIGNIFICANT CHANGE UP (ref 10.3–14.5)
SODIUM SERPL-SCNC: 139 MMOL/L — SIGNIFICANT CHANGE UP (ref 135–145)
WBC # BLD: 5.01 K/UL — SIGNIFICANT CHANGE UP (ref 3.8–10.5)
WBC # FLD AUTO: 5.01 K/UL — SIGNIFICANT CHANGE UP (ref 3.8–10.5)

## 2017-11-28 PROCEDURE — 84100 ASSAY OF PHOSPHORUS: CPT

## 2017-11-28 PROCEDURE — 80048 BASIC METABOLIC PNL TOTAL CA: CPT

## 2017-11-28 PROCEDURE — 96375 TX/PRO/DX INJ NEW DRUG ADDON: CPT

## 2017-11-28 PROCEDURE — 96372 THER/PROPH/DIAG INJ SC/IM: CPT | Mod: XU

## 2017-11-28 PROCEDURE — 96374 THER/PROPH/DIAG INJ IV PUSH: CPT

## 2017-11-28 PROCEDURE — 74018 RADEX ABDOMEN 1 VIEW: CPT

## 2017-11-28 PROCEDURE — 99285 EMERGENCY DEPT VISIT HI MDM: CPT | Mod: 25

## 2017-11-28 PROCEDURE — 99231 SBSQ HOSP IP/OBS SF/LOW 25: CPT

## 2017-11-28 PROCEDURE — 83735 ASSAY OF MAGNESIUM: CPT

## 2017-11-28 PROCEDURE — 85027 COMPLETE CBC AUTOMATED: CPT

## 2017-11-28 RX ADMIN — Medication 650 MILLIGRAM(S): at 00:21

## 2017-11-28 RX ADMIN — Medication 100 MILLIGRAM(S): at 05:01

## 2017-11-28 RX ADMIN — Medication 200 MILLIGRAM(S): at 06:20

## 2017-11-28 NOTE — DISCHARGE NOTE ADULT - MEDICATION SUMMARY - MEDICATIONS TO TAKE
I will START or STAY ON the medications listed below when I get home from the hospital:    Remicade  -- Indication: For imunosuppressive agents

## 2017-11-28 NOTE — DISCHARGE NOTE ADULT - CARE PROVIDER_API CALL
Papi Javed), ColonRectal Surgery; Surgery  [address] [facility]  [address]  [place] [address]  Phone: [phone]  Fax: [phone]    Kassie West), Internal Medicine  [address] [place] [id number]  Phone: [phone]  Fax: [phone]

## 2017-11-28 NOTE — DISCHARGE NOTE ADULT - ADDITIONAL INSTRUCTIONS
At the time of discharge, the patient was hemodynamically stable, was tolerating PO diet, was voiding urine and passing stool, was ambulating, and was comfortable with adequate pain control. The patient was instructed to follow up with Dr. Javed within 1 week after discharge from the hospital. The patient felt comfortable with discharge. The patient was discharged to home. The patient had no other issues.    Please notify your Attending Physician if after discharge you have fever, chills, abdominal pain, purulent drainage from your incision, in ability to pass flatus, not tolerating PO diet, abdominal bloating/distention, naseau and or vomiting. Additionally any acute changes as well. Please report back to the Emergency Department if unable to reach Physician. WOUND CARE:   BATHING: Please do not submerge wound underwater. You may shower and/or sponge bathe.  ACTIVITY: No heavy lifting or straining. Otherwise, you may return to your usual level of physical activity. If you are taking narcotic pain medication (such as Percocet), do NOT drive a car, operate machinery or make important decisions.  DIET: Return to your usual diet.  NOTIFY YOUR SURGEON IF: You have any bleeding that does not stop, any pus draining from your wound, any fever (over 100.4 F) or chills, persistent nausea/vomiting, persistent diarrhea, or if your pain is not controlled on your discharge pain medications.  FOLLOW-UP:  1. Please call to make a follow-up appointment within one week of discharge with Dr. Javed.   2. Please follow up with your primary care physician in one week regarding your hospitalization.  3. Please follow up within one week of discharge with Dr. West, for follow up.         Please notify your Attending Physician if after discharge you have fever, chills, abdominal pain, purulent drainage from your incision, in ability to pass flatus, not tolerating PO diet, abdominal bloating/distention, naseau and or vomiting. Additionally any acute changes as well. Please report back to the Emergency Department if unable to reach Physician.

## 2017-11-28 NOTE — DISCHARGE NOTE ADULT - PLAN OF CARE
s/p SBO resolution -F/U within one week with Dr. Javed and Dr. West.    -Do not perform strenuous activity until less cleared by a physician   -LRD

## 2017-11-28 NOTE — PROGRESS NOTE ADULT - ASSESSMENT
43 y/o manw tih crohns diease on remicade with anastomotic stricture/SBO triggered by dietery non compliance  appreciate surgical care.   SBO clinically resolved  f/u as outpatient  if tolerate diet to be discharged home  call with any questions  [phone]

## 2017-11-28 NOTE — PROGRESS NOTE ADULT - ASSESSMENT
Assessment   42y Male who presents with Small bowel obstruction    Plan:  -DVT PPX  -Pain control   -OOB as tolerated with assistance   -Advance diet as tolerated  -Await Gi Fxn   -Dispo Planning     Bea Hargrove   #[id number] @ 06:53

## 2017-11-28 NOTE — DISCHARGE NOTE ADULT - NS AS ACTIVITY OBS
Do not make important decisions/Do not drive or operate machinery/Showering allowed/Not while taking pain medication/No Heavy lifting/straining

## 2017-11-28 NOTE — PROGRESS NOTE ADULT - SUBJECTIVE AND OBJECTIVE BOX
INTERVAL HPI/OVERNIGHT EVENTS:  feels beter. having bowel movements. no further distention or pain    MEDICATIONS  (STANDING):  ciprofloxacin   IVPB 400 milliGRAM(s) IV Intermittent every 12 hours  ciprofloxacin   IVPB      enoxaparin Injectable 40 milliGRAM(s) SubCutaneous daily  influenza   Vaccine 0.5 milliLiter(s) IntraMuscular once  metroNIDAZOLE  IVPB 500 milliGRAM(s) IV Intermittent every 8 hours  metroNIDAZOLE  IVPB        MEDICATIONS  (PRN):  acetaminophen   Tablet. 650 milliGRAM(s) Oral every 6 hours PRN Mild Pain (1 - 3)  ondansetron Injectable 4 milliGRAM(s) IV Push every 6 hours PRN Nausea and/or Vomiting      Allergies    Ceclor (Hives)  penicillin (Hives)  penicillins (Unknown)    Intolerances        Vital Signs Last 24 Hrs  T(C): 36.7 (2017 10:50), Max: 37.2 (2017 21:45)  T(F): 98.1 (2017 10:50), Max: 98.9 (2017 21:45)  HR: 70 (2017 10:50) (60 - 70)  BP: 107/75 (2017 10:50) (99/62 - 122/73)  BP(mean): --  RR: 18 (2017 10:50) (18 - 18)  SpO2: 98% (2017 10:50) (96% - 99%)    PHYSICAL EXAM:  General: comfortable in No acute distress  CV: regular rate and rhythm. no murmurs rubs or gallops  Lungs: clear to ascultation bilaterally  abdomen: soft nontender nondistened normal bowel sounds  ext: negative edema  skin: no rashes noted          LABS:                        13.4   5.01  )-----------( 190      ( 2017 07:48 )             36.3         139  |  101  |  7   ----------------------------<  95  4.0   |  26  |  0.90    Ca    8.7      2017 08:57  Phos  3.0       Mg     1.9         TPro  7.7  /  Alb  3.8  /  TBili  1.4<H>  /  DBili  x   /  AST  65<H>  /  ALT  91<H>  /  AlkPhos  54        Urinalysis Basic - ( 2017 14:55 )    Color: Yellow / Appearance: Clear / S.010 / pH: x  Gluc: x / Ketone: Moderate  / Bili: Negative / Urobili: Negative mg/dL   Blood: x / Protein: Negative mg/dL / Nitrite: Negative   Leuk Esterase: Negative / RBC: 0-2 /HPF / WBC Negative   Sq Epi: x / Non Sq Epi: Negative / Bacteria: Occasional      LIVER FUNCTIONS - ( 2017 13:46 )  Alb: 3.8 g/dL / Pro: 7.7 gm/dL / ALK PHOS: 54 U/L / ALT: 91 U/L / AST: 65 U/L / GGT: x             RADIOLOGY & ADDITIONAL TESTS:

## 2017-11-28 NOTE — DISCHARGE NOTE ADULT - PATIENT PORTAL LINK FT
“You can access the Follow[employer] Patient Portal, offered by [facility], by registering with the following website: [email or web address]”

## 2017-11-28 NOTE — DISCHARGE NOTE ADULT - CARE PROVIDERS DIRECT ADDRESSES
[email or web address]

## 2017-11-28 NOTE — DISCHARGE NOTE ADULT - HOSPITAL COURSE
This is a 42 year old male with medical history of Crohn's disease s/p multiple bowel resections on Remicade presenting as transfer from [facility] with abdominal pain in the RLQ, the pain is constant, sharp, associated with multiple episodes of vomiting of clear fluid, He had a normal bowel movement a day ago and passed flatus around the same time. He denies fevers or chills. The pain is similar to prior episodes of bowel obstruction. Of note, the patient was admitted multiple times with bowel obstruction. ROS: Denies fever, palpitations, chills, recent sickness, HA, vision changes, cough, SOB, chest pain, dysuria, hematuria, rash, new joint aches, sick contacts, and recent travel.     Patient was kept NPO with no NGT. Team monitored serial abdominal exams, and once GI Function was noted patient was advanced to clear liquids. The patient tolerated with no abdominal pain, and was advanced to LRD. The patient tolerated LRD and was discharged home. Abdominal xray was performed on 11/28/2017 to evaluate Small bowel obstruction and progress     During the hospital course, patient had lab work performed on a daily basis. Prior to discharge lab work was noted to be within normal. A normal WBC was noted to be down trending, and a HCT was noted within normal limits. Vitals were checked on a four hour basis, and patient was noted to be normotensive and afebrile upon discharge. Patient had a normal heart rate and adequate oxygen saturations. SOB/GARCIA was denied. Pain medication was given prior to discharge with no allergic reaction, and reported adequate pain control. GI was called, and noted for follow up.     At the time of discharge, the patient was hemodynamically stable, was tolerating PO diet, was voiding urine and passing stool, was ambulating, and was comfortable with adequate pain control. The patient was instructed to follow up with Dr. Javed within 1 week after discharge from the hospital. The patient felt comfortable with discharge. The patient was discharged to home. The patient had no other issues.

## 2017-11-28 NOTE — PROGRESS NOTE ADULT - SUBJECTIVE AND OBJECTIVE BOX
GENERAL SURGERY DAILY PROGRESS NOTE:     Subjective: Patient seen and examined. Patient stable with no overnight events. Patient denies CP/ SOB/ Palpatations. Patient denies N/V. Reports flatus and no BM. Pain well controlled.     MEDICATIONS  (STANDING):  ciprofloxacin   IVPB 400 milliGRAM(s) IV Intermittent every 12 hours  ciprofloxacin   IVPB      dextrose 5% + sodium chloride 0.45% with potassium chloride 20 mEq/L 1000 milliLiter(s) (50 mL/Hr) IV Continuous <Continuous>  enoxaparin Injectable 40 milliGRAM(s) SubCutaneous daily  influenza   Vaccine 0.5 milliLiter(s) IntraMuscular once  metroNIDAZOLE  IVPB 500 milliGRAM(s) IV Intermittent every 8 hours  metroNIDAZOLE  IVPB        MEDICATIONS  (PRN):  acetaminophen   Tablet. 650 milliGRAM(s) Oral every 6 hours PRN Mild Pain (1 - 3)  ondansetron Injectable 4 milliGRAM(s) IV Push every 6 hours PRN Nausea and/or Vomiting    Vital Signs Last 24 Hrs:   T(C): 36.9 (2017 05:19), Max: 37.2 (2017 21:45)  T(F): 98.4 (2017 05:19), Max: 98.9 (2017 21:45)  HR: 60 (2017 05:19) (60 - 76)  BP: 99/62 (2017 05:19) (99/62 - 122/73)  BP(mean): --  RR: 18 (2017 05:19) (18 - 18)  SpO2: 98% (2017 05:19) (96% - 99%)    I&O's Detail    2017 07:01  -  2017 07:00  --------------------------------------------------------  IN:    IV PiggyBack: 300 mL    lactated ringers.: 1500 mL    sodium chloride 0.9%: 400 mL  Total IN: 2200 mL    OUT:    Voided: 850 mL  Total OUT: 850 mL    Total NET: 1350 mL      2017 07:01  -  2017 06:53  --------------------------------------------------------  IN:    dextrose 5% + sodium chloride 0.45% with potassium chloride 20 mEq/L: 900 mL    IV PiggyBack: 200 mL    Oral Fluid: 240 mL  Total IN: 1340 mL    OUT:    Voided: 1300 mL  Total OUT: 1300 mL    Total NET: 40 mL          Daily     Daily     LABS:                        14.6   6.1   )-----------( 191      ( 2017 12:32 )             41.7         138  |  101  |  9   ----------------------------<  91  3.8   |  25  |  1.02    Ca    8.7      2017 12:32  Phos  2.9       Mg     1.7         TPro  7.7  /  Alb  3.8  /  TBili  1.4<H>  /  DBili  x   /  AST  65<H>  /  ALT  91<H>  /  AlkPhos  54  11-26      Urinalysis Basic - ( 2017 14:55 )  Color: Yellow / Appearance: Clear / S.010 / pH: x  Gluc: x / Ketone: Moderate  / Bili: Negative / Urobili: Negative mg/dL   Blood: x / Protein: Negative mg/dL / Nitrite: Negative   Leuk Esterase: Negative / RBC: 0-2 /HPF / WBC Negative   Sq Epi: x / Non Sq Epi: Negative / Bacteria: Occasional    Physical Exam:   Gen: NAD, AAOx3  Abd: soft, NT, ND

## 2017-11-28 NOTE — DISCHARGE NOTE ADULT - CARE PLAN
Principal Discharge DX:	SBO (small bowel obstruction)  Goal:	s/p SBO resolution  Instructions for follow-up, activity and diet:	-F/U within one week with Dr. Javed and Dr. West.    -Do not perform strenuous activity until less cleared by a physician   -LRD

## 2018-01-21 ENCOUNTER — EMERGENCY (EMERGENCY)
Facility: HOSPITAL | Age: 43
LOS: 0 days | Discharge: HOME | End: 2018-01-21
Attending: EMERGENCY MEDICINE | Admitting: EMERGENCY MEDICINE
Payer: COMMERCIAL

## 2018-01-21 VITALS
OXYGEN SATURATION: 100 % | RESPIRATION RATE: 18 BRPM | HEART RATE: 66 BPM | DIASTOLIC BLOOD PRESSURE: 76 MMHG | SYSTOLIC BLOOD PRESSURE: 150 MMHG | WEIGHT: 145.95 LBS | HEIGHT: 68 IN

## 2018-01-21 VITALS
TEMPERATURE: 99 F | DIASTOLIC BLOOD PRESSURE: 68 MMHG | OXYGEN SATURATION: 100 % | RESPIRATION RATE: 18 BRPM | SYSTOLIC BLOOD PRESSURE: 109 MMHG | HEART RATE: 86 BPM

## 2018-01-21 DIAGNOSIS — M54.9 DORSALGIA, UNSPECIFIED: ICD-10-CM

## 2018-01-21 DIAGNOSIS — N20.1 CALCULUS OF URETER: ICD-10-CM

## 2018-01-21 DIAGNOSIS — Z92.89 PERSONAL HISTORY OF OTHER MEDICAL TREATMENT: Chronic | ICD-10-CM

## 2018-01-21 LAB
ALBUMIN SERPL ELPH-MCNC: 3.9 G/DL — SIGNIFICANT CHANGE UP (ref 3.3–5)
ALP SERPL-CCNC: 48 U/L — SIGNIFICANT CHANGE UP (ref 40–120)
ALT FLD-CCNC: 42 U/L — SIGNIFICANT CHANGE UP (ref 12–78)
ANION GAP SERPL CALC-SCNC: 8 MMOL/L — SIGNIFICANT CHANGE UP (ref 5–17)
APPEARANCE UR: CLEAR — SIGNIFICANT CHANGE UP
AST SERPL-CCNC: 22 U/L — SIGNIFICANT CHANGE UP (ref 15–37)
BACTERIA # UR AUTO: (no result)
BASOPHILS # BLD AUTO: 0.1 K/UL — SIGNIFICANT CHANGE UP (ref 0–0.2)
BASOPHILS NFR BLD AUTO: 0.9 % — SIGNIFICANT CHANGE UP (ref 0–2)
BILIRUB SERPL-MCNC: 0.6 MG/DL — SIGNIFICANT CHANGE UP (ref 0.2–1.2)
BILIRUB UR-MCNC: NEGATIVE — SIGNIFICANT CHANGE UP
BUN SERPL-MCNC: 14 MG/DL — SIGNIFICANT CHANGE UP (ref 7–23)
CALCIUM SERPL-MCNC: 8.9 MG/DL — SIGNIFICANT CHANGE UP (ref 8.5–10.1)
CHLORIDE SERPL-SCNC: 105 MMOL/L — SIGNIFICANT CHANGE UP (ref 96–108)
CO2 SERPL-SCNC: 27 MMOL/L — SIGNIFICANT CHANGE UP (ref 22–31)
COLOR SPEC: YELLOW — SIGNIFICANT CHANGE UP
CREAT SERPL-MCNC: 1.22 MG/DL — SIGNIFICANT CHANGE UP (ref 0.5–1.3)
DIFF PNL FLD: (no result)
EOSINOPHIL # BLD AUTO: 0.3 K/UL — SIGNIFICANT CHANGE UP (ref 0–0.5)
EOSINOPHIL NFR BLD AUTO: 2.6 % — SIGNIFICANT CHANGE UP (ref 0–6)
EPI CELLS # UR: SIGNIFICANT CHANGE UP
GLUCOSE SERPL-MCNC: 102 MG/DL — HIGH (ref 70–99)
GLUCOSE UR QL: NEGATIVE MG/DL — SIGNIFICANT CHANGE UP
HCT VFR BLD CALC: 43.7 % — SIGNIFICANT CHANGE UP (ref 39–50)
HGB BLD-MCNC: 15.2 G/DL — SIGNIFICANT CHANGE UP (ref 13–17)
KETONES UR-MCNC: NEGATIVE — SIGNIFICANT CHANGE UP
LEUKOCYTE ESTERASE UR-ACNC: (no result)
LYMPHOCYTES # BLD AUTO: 2.7 K/UL — SIGNIFICANT CHANGE UP (ref 1–3.3)
LYMPHOCYTES # BLD AUTO: 25.6 % — SIGNIFICANT CHANGE UP (ref 13–44)
MCHC RBC-ENTMCNC: 30.3 PG — SIGNIFICANT CHANGE UP (ref 27–34)
MCHC RBC-ENTMCNC: 34.8 GM/DL — SIGNIFICANT CHANGE UP (ref 32–36)
MCV RBC AUTO: 87.1 FL — SIGNIFICANT CHANGE UP (ref 80–100)
MONOCYTES # BLD AUTO: 0.7 K/UL — SIGNIFICANT CHANGE UP (ref 0–0.9)
MONOCYTES NFR BLD AUTO: 7 % — SIGNIFICANT CHANGE UP (ref 2–14)
NEUTROPHILS # BLD AUTO: 6.7 K/UL — SIGNIFICANT CHANGE UP (ref 1.8–7.4)
NEUTROPHILS NFR BLD AUTO: 63.8 % — SIGNIFICANT CHANGE UP (ref 43–77)
NITRITE UR-MCNC: NEGATIVE — SIGNIFICANT CHANGE UP
PH UR: 5 — SIGNIFICANT CHANGE UP (ref 5–8)
PLATELET # BLD AUTO: 278 K/UL — SIGNIFICANT CHANGE UP (ref 150–400)
POTASSIUM SERPL-MCNC: 3.2 MMOL/L — LOW (ref 3.5–5.3)
POTASSIUM SERPL-SCNC: 3.2 MMOL/L — LOW (ref 3.5–5.3)
PROT SERPL-MCNC: 7.9 GM/DL — SIGNIFICANT CHANGE UP (ref 6–8.3)
PROT UR-MCNC: 100 MG/DL
RBC # BLD: 5.01 M/UL — SIGNIFICANT CHANGE UP (ref 4.2–5.8)
RBC # FLD: 12 % — SIGNIFICANT CHANGE UP (ref 10.3–14.5)
RBC CASTS # UR COMP ASSIST: >50 /HPF (ref 0–4)
SODIUM SERPL-SCNC: 140 MMOL/L — SIGNIFICANT CHANGE UP (ref 135–145)
SP GR SPEC: 1.01 — SIGNIFICANT CHANGE UP (ref 1.01–1.02)
UROBILINOGEN FLD QL: 1 MG/DL
WBC # BLD: 10.5 K/UL — SIGNIFICANT CHANGE UP (ref 3.8–10.5)
WBC # FLD AUTO: 10.5 K/UL — SIGNIFICANT CHANGE UP (ref 3.8–10.5)
WBC UR QL: SIGNIFICANT CHANGE UP

## 2018-01-21 PROCEDURE — 74176 CT ABD & PELVIS W/O CONTRAST: CPT | Mod: 26

## 2018-01-21 PROCEDURE — 99285 EMERGENCY DEPT VISIT HI MDM: CPT

## 2018-01-21 RX ORDER — ONDANSETRON 8 MG/1
1 TABLET, FILM COATED ORAL
Qty: 12 | Refills: 0 | OUTPATIENT
Start: 2018-01-21 | End: 2018-01-24

## 2018-01-21 RX ORDER — ONDANSETRON 8 MG/1
4 TABLET, FILM COATED ORAL ONCE
Qty: 0 | Refills: 0 | Status: COMPLETED | OUTPATIENT
Start: 2018-01-21 | End: 2018-01-21

## 2018-01-21 RX ORDER — TAMSULOSIN HYDROCHLORIDE 0.4 MG/1
1 CAPSULE ORAL
Qty: 10 | Refills: 0 | OUTPATIENT
Start: 2018-01-21 | End: 2018-01-30

## 2018-01-21 RX ORDER — INFLIXIMAB-DYYB 120 MG/ML
0 INJECTION SUBCUTANEOUS
Qty: 0 | Refills: 0 | COMMUNITY

## 2018-01-21 RX ORDER — OXYCODONE AND ACETAMINOPHEN 5; 325 MG/1; MG/1
1 TABLET ORAL ONCE
Qty: 0 | Refills: 0 | Status: COMPLETED | OUTPATIENT
Start: 2018-01-21 | End: 2018-01-21

## 2018-01-21 RX ORDER — SODIUM CHLORIDE 9 MG/ML
1000 INJECTION INTRAMUSCULAR; INTRAVENOUS; SUBCUTANEOUS ONCE
Qty: 0 | Refills: 0 | Status: COMPLETED | OUTPATIENT
Start: 2018-01-21 | End: 2018-01-21

## 2018-01-21 RX ORDER — HYDROMORPHONE HYDROCHLORIDE 2 MG/ML
1 INJECTION INTRAMUSCULAR; INTRAVENOUS; SUBCUTANEOUS ONCE
Qty: 0 | Refills: 0 | Status: DISCONTINUED | OUTPATIENT
Start: 2018-01-21 | End: 2018-01-21

## 2018-01-21 RX ORDER — KETOROLAC TROMETHAMINE 30 MG/ML
30 SYRINGE (ML) INJECTION ONCE
Qty: 0 | Refills: 0 | Status: DISCONTINUED | OUTPATIENT
Start: 2018-01-21 | End: 2018-01-21

## 2018-01-21 RX ADMIN — HYDROMORPHONE HYDROCHLORIDE 1 MILLIGRAM(S): 2 INJECTION INTRAMUSCULAR; INTRAVENOUS; SUBCUTANEOUS at 13:40

## 2018-01-21 RX ADMIN — ONDANSETRON 4 MILLIGRAM(S): 8 TABLET, FILM COATED ORAL at 13:38

## 2018-01-21 RX ADMIN — Medication 30 MILLIGRAM(S): at 12:38

## 2018-01-21 RX ADMIN — Medication 30 MILLIGRAM(S): at 14:35

## 2018-01-21 RX ADMIN — SODIUM CHLORIDE 1000 MILLILITER(S): 9 INJECTION INTRAMUSCULAR; INTRAVENOUS; SUBCUTANEOUS at 12:38

## 2018-01-21 NOTE — ED PROVIDER NOTE - CONSTITUTIONAL, MLM
normal... Well appearing, well nourished, awake, alert, oriented to person, place, time/situation. Tearful and in distress secondary to pain.

## 2018-01-21 NOTE — ED ADULT NURSE NOTE - CHPI ED SYMPTOMS NEG
no neck tenderness/no numbness/no constipation/no fatigue/no bowel dysfunction/no motor function loss

## 2018-01-21 NOTE — ED PROVIDER NOTE - SKIN, MLM
Skin normal color for race, warm, dry and intact. No evidence of rash. Vertical ABD scar well healed. from prior surgery

## 2018-01-21 NOTE — ED ADULT NURSE NOTE - OBJECTIVE STATEMENT
pt states he started to have sharp left lower back pain started 2 hours ago, pt states pain is constant and has not been able to urinating for past 2 hours, denies bleeding, pt states he did not take any medication.

## 2018-01-21 NOTE — ED PROVIDER NOTE - MUSCULOSKELETAL, MLM
Spine appears normal, range of motion is not limited, no muscle or joint tenderness. Back none tender upon palpitation.

## 2018-01-21 NOTE — ED ADULT TRIAGE NOTE - CHIEF COMPLAINT QUOTE
Patient presents with left sided back pain, patient reports 20 out of 10 pain. Patient reports history of chrons. EMS reports patient requesting dilaudid

## 2018-01-21 NOTE — ED PROVIDER NOTE - MEDICAL DECISION MAKING DETAILS
42 y-o Male c/o of sever back pain.   Plan: Labs, Ct renal stone hunt, pain management, Zofran, observe and reassess.

## 2018-01-21 NOTE — ED PROVIDER NOTE - OBJECTIVE STATEMENT
42 y-o Male with PMHX of Crohn's with multiple bowel resections, presents to the ED BIB EMS c/o sever back pain. Pt states pain started about an hour ago, with sudden onset while laying in bed. EMS gave morphine prior to arrival to hospital with no elevation of pain. Pt states he lifted something a few days ago and felt a pull but other wise went away. Pt notes urine this morning was darker then usual. Pt in distress secondary pain. Pain is 10/10. Denies CP, SOB, N/V/D, dysuria, hematuria.

## 2018-01-22 LAB
CULTURE RESULTS: NO GROWTH — SIGNIFICANT CHANGE UP
SPECIMEN SOURCE: SIGNIFICANT CHANGE UP

## 2018-11-07 NOTE — ED PROVIDER NOTE - ATTENDING CONTRIBUTION TO CARE
PAGER ID: [phone]   MESSAGE: pt in 228 having c/o nausea and requesting medication. she made suicidal comment regarding current health status. scored one of depression screening, stated she's not suicidal just frustrated.  Reed John [phone]    Text page sent to MD 41 yo M transferred from [facility] for SBO. Patient has a h/o chron's disease s/p multiple surgeries and colostomy bag. Surgeon Dr. Javed. Had bloodwork done which was normal. CT demonstrating RIGHT hemicolectomy with anastomosis in the RIGHT upper  quadrant. There is distention of ileum with fecalization proximal to the anastomosis suggesting obstruction at this level. Patient complains of 43 yo M transferred from [facility] for SBO. Patient has a h/o chron's disease s/p multiple surgeries. Surgeon Dr. Javed. Had bloodwork done which was normal. CT demonstrating RIGHT hemicolectomy with anastomosis in the RIGHT upper  quadrant. There is distention of ileum with fecalization proximal to the anastomosis suggesting obstruction at this level. Patient complains of 24 hours of severe dull cramping RLQ pain a/w nausea and at least 15 episodes of vomiting. No fevers/chills. no urinary complaints  PE with RLQ TTP. soft, nondistended 41 yo M transferred from [facility] for SBO. Patient has a h/o chron's disease s/p multiple surgeries. Surgeon Dr. Javed. Had bloodwork done which was normal. CT demonstrating RIGHT hemicolectomy with anastomosis in the RIGHT upper  quadrant. There is distention of ileum with fecalization proximal to the anastomosis suggesting obstruction at this level. Patient complains of 24 hours of severe dull cramping RLQ pain a/w nausea and at least 15 episodes of vomiting. pain rate 6/10 at this time. No fevers/chills. no urinary complaints  PE with RLQ TTP. soft, nondistended. VSS.   I reviewed all bloodwork and CT from OT. I consulted surgery who came to evaluate patient in the ER. Patient refused NGT.     Based on patient's HPI as well as the results of today's workup, I felt that patient warranted admission to the hospital for further workup/evaluation and continued management. I discussed the findings of today's workup with the patient and addressed the patient's questions and concerns. The patient was agreeable with admission. I spoke with the hospitalist who accepted the patient for admission and subsequently took over the patient's care.

## 2020-02-18 NOTE — ED ADULT TRIAGE NOTE - BP NONINVASIVE DIASTOLIC (MM HG)
Detail Level: Zone Recommendations (Free Text): Continue to utilize Immiquimod and RTC in 1 year. Recommendation Preamble: The following recommendations were made during the visit: 71

## 2023-05-25 NOTE — ED ADULT NURSE NOTE - GASTROINTESTINAL WDL
Abdomen soft, nontender, nondistended, bowel sounds present in all 4 quadrants. You can access the FollowMyHealth Patient Portal offered by [facility] by registering at the following website: [email or web address]. By joining [employer]’s FollowMyHealth portal, you will also be able to view your health information using other applications (apps) compatible with our system.